# Patient Record
Sex: FEMALE
[De-identification: names, ages, dates, MRNs, and addresses within clinical notes are randomized per-mention and may not be internally consistent; named-entity substitution may affect disease eponyms.]

---

## 2018-04-15 ENCOUNTER — HOSPITAL ENCOUNTER (EMERGENCY)
Dept: HOSPITAL 25 - ED | Age: 28
Discharge: HOME | End: 2018-04-15
Payer: COMMERCIAL

## 2018-04-15 VITALS — SYSTOLIC BLOOD PRESSURE: 108 MMHG | DIASTOLIC BLOOD PRESSURE: 93 MMHG

## 2018-04-15 DIAGNOSIS — F41.9: ICD-10-CM

## 2018-04-15 DIAGNOSIS — F10.129: Primary | ICD-10-CM

## 2018-04-15 PROCEDURE — 99282 EMERGENCY DEPT VISIT SF MDM: CPT

## 2018-04-15 NOTE — ED
Zac MCCARTHY Rebecca, scribed for Ramon Jackson MD on 04/15/18 at 0455 .





Substance Abuse/Use





- HPI Summary


HPI Summary: 


Pt is a 26 y/o F who presents to ED brought in by her  as he was 

concerned that she was unable to enter the code to get into her residence. Pt 

reports she had been consuming EtOH at a bar, though she states "things got 

blurry really quick." States that she felt like she had no control, which is 

atypical for how she has felt before when drinking alcohol. Denies using any 

other drugs and denies any pain. Confirms she has someone that can pick her up 

from the ED and that she is feeling much better. 





- History Of Current Complaint


Chief Complaint: EDSubstanceAbuse


Stated Complaint: ETOH


Time Seen by Provider: 04/15/18 04:46


Hx Obtained From: Patient


Ingestion History: Type/Name Of Drug - EtOH


Overdose Characteristics: Oral


Severity Currently: None


Associated Signs And Symptoms: Negative





- Allergies/Home Medications


Allergies/Adverse Reactions: 


 Allergies











Allergy/AdvReac Type Severity Reaction Status Date / Time


 


No Known Allergies Allergy   Verified 04/15/18 04:38














PMH/Surg Hx/FS Hx/Imm Hx


Endocrine/Hematology History: 


   Denies: Hx Diabetes


Cardiovascular History: 


   Denies: Hx Hypertension, Hx Pacemaker/ICD


 History: 


   Denies: Hx Renal Disease


Sensory History: 


   Denies: Hx Hearing Aid


Psychiatric History: Reports: Hx Anxiety


   Denies: Hx Eating Disorder, Hx Panic Disorder





- Surgical History


Surgery Procedure, Year, and Place: OVARIAN CYST REMOVED 2016 [place]





- Immunization History


Date of Tetanus Vaccine: utd


Date of Influenza Vaccine: none


Infectious Disease History: No


Infectious Disease History: 


   Denies: Traveled Outside the [place] in Last 30 Days





- Family History


Known Family History: 


   Negative: Cardiac Disease, Hypertension, Diabetes





- Social History


Alcohol Use: Occasionally


Substance Use Type: Reports: None


Smoking Status (MU): Never Smoked Tobacco





Review of Systems


Negative: Fever


Positive: Other - NEGATIVE: Pain


Positive: Other - EtOH use


All Other Systems Reviewed And Are Negative: Yes





Physical Exam





- Summary


Physical Exam Summary: 


Appearance: Well appearing, no pain distress


Skin: warm, dry, reflects adequate perfusion


Head/face: normal


Eyes: EOMI, EMIL, injected eyes


ENT: normal, mucous membranes moist


Neck: supple, non-tender


Respiratory: CTA, breath sounds present


Cardiovascular: RRR, pulses symmetrical 


Abdomen: non-tender, soft


Bowel Sounds: present


Musculoskeletal: normal, strength/ROM intact, no sign of injury to arms


Neuro: normal, sensory motor intact, A&Ox3





Triage Information Reviewed: Yes


Vital Signs On Initial Exam: 


 Initial Vitals











Temp Pulse Resp BP Pulse Ox


 


 98 F   85   20   131/96   98 


 


 04/15/18 04:11  04/15/18 04:11  04/15/18 04:11  04/15/18 04:11  04/15/18 04:11











Vital Signs Reviewed: Yes





Diagnostics





- Vital Signs


 Vital Signs











  Temp Pulse Resp BP Pulse Ox


 


 04/15/18 04:11  98 F  85  20  131/96  98














- Laboratory


Lab Statement: Any lab studies that have been ordered have been reviewed, and 

results considered in the medical decision making process.





Course/Dx





- Course


Course Of Treatment: ETOH tonight. Pt now sobered. AAO, clear speech and steady 

gait.


Assessment/Plan: Offered drug testing to the patient, which she refused.





- Diagnoses


Provider Diagnoses: 


 Alcohol intoxication








Discharge





- Sign-Out/Discharge


Documenting (check all that apply): Discharge - Discharge





- Discharge Plan


Condition: Improved


Disposition: HOME


Patient Education Materials:  Alcohol Intoxication (ED)


Referrals: 


Lizzeth Santa NP [Primary Care Provider] - 


Additional Instructions: 


Never go out drinking alone. Always have a sober .


When out, protect yourself from anyone putting something in your drink.


Return if worse, new symptoms or other concerns as discussed.





The documentation as recorded by the Zac cobb Rebecca accurately 

reflects the service I personally performed and the decisions made by me, 

Ramon Jackson MD.